# Patient Record
(demographics unavailable — no encounter records)

---

## 2024-10-29 NOTE — ACTIVE PROBLEMS
[FreeTextEntry1] : [facility] due to preconcept- IVF (own egg). Pt had failed IVF transfer in 05/2024. Pt has 2 embryos left that are not pre-genetically tested. Pt had hysteroscopy 10/25/24 and was taking Lupron for endometriosis. Last dose was 10/17/24. Per pt structrually everything is wnl but the biopsy from inflamed tissue will take 1-2 weeks to result. Pt can proceed with embryo transfer 7 weeks after last lupron dose (~12/5/24). Pt had hx of miscarriage in 2023- IVF - 9 weeks with D&C.  Pt had a thrombophilia work up.  Pt was seen by hematology who mandeep labs and referred her to rheumatology.  Positive anticardiolipin/lupus, beta glycoprotein and SCT.  Heme rec. Lovenox at the start of embryo transfer. Pt states during her 2021 pregnancy she used IUI- IOL due to gestational hypertension and gestational diabetes on insulin (150 Units).  Pt had a c/s due to failed induction.  Pt states she was on MAG and discharged on labetalol for 15 days PP.  No BP issues since. Pt had a A1C which was 6.5% as per pt in 2023.  Pt is currently wearing a CGM and reports without mounjaro her fastings are 150, while she was taking mounjaro her fastings were ~120. Pt states she was also dx with PCOS in 2001- on and off metformin. Pt is currently taking metformin 1000 mg BID. Pt was previously taking NPH 70 U HS and stopped after her failed embryo transfer 05/2024 and started mounjaro 7.5mg. Pt lost ~20 lbs and had to stop taking mounjaro for her hysteroscopy 10/4/24. Pt is followed by endo last visit 7/29/24 and has f/u 11/29/24 and would like to discuss insulin again.  Pt is now being followed by a rheumatologist (Dr. Thurman) which she spoke with 10/2 and has f/u 12/1/24. Pt is currently taking Plaquenil 200 mg BID and was told to start prednisone 10mg and lovenox with her embryo transfer. Pt wants to discuss dosage. Pt is followed by an endocrinologist for Hashimoto's disease which was dx in 2007- TFT's in 2/2024.  Pt is currently taking Synthroid 200 mcg. Pt states she is also currently taking Lexapro 10 mg daily for anxiety-prescribed by PCP- Pt has been on this dosage for about 3 years. pt does not see a therapist.  [Hypertension] : no hypertension [Heart Disease] : no heart disease [Autoimmune Disease] : no autoimmune disease [Renal Disease] : no kidney disease, no UTI [Neurologic Disorder] : no neurologic disorder, no epilepsy [Psychiatric Disorders] : no psychiatric disorders [Depression] : no depression, no post partum depression [Hepatic Disorder] : no hepatitis, no liver disease [Thrombophlebitis] : no varicosities, no phlebitis [Trauma] : no trauma/violence [Blood Transfusion (___ Ml)] : no history of blood transfusion

## 2024-10-29 NOTE — SURGICAL HISTORY
[Fibroids] : no fibroids [Abn Paps] : no abnormal pap smears [Breast Disease] : no breast disease [STI's] : no STI's [Infertility] : infertility [Cysts] : no cysts [OC Use] : no OC use [Last Pap: ___] : Last Pap: [unfilled] [Last Mammo: ___] : Last Mammo: none

## 2024-10-29 NOTE — DISCUSSION/SUMMARY
[FreeTextEntry1] : We had the pleasure of seeing your patient for a Maternal-Fetal Medicine preconception consultation. She had prior preconception visits in October 2023 and May 2024. Today, she was counseled regarding the following issues:  Katelyn has antiphospholipid syndrome (APLS) with triple positive laboratory workup, class 3 obesity, type 2 diabetes, Hashimoto's/hypothyroidism, and depression. She plans to undergo another IVF transfer in December 2024. She had an unsuccessful transfer in May 2024. She subsequently lost approx 25 lbs but has gained approx 8 lbs back. Discussed medication management.  Recommend: Low-dose aspirin daily Lovenox 60 mg daily as prophylactic dose based on her weight--understands risk/benefit in terms of bleeding vs. clotting, should begin after IVF transfer Metformin 2000 mg BID Follow-up scheduled with endocrinologist--further optimization of glucose levels is needed Agree with treatment with plaquenyl/HCQ throughout pregnancy Continue levothyroxine 200 mg daily Regarding prednisone dose for IVF treatment, I encouraged Katelyn to work within the standard institutional protocols for dosing. As when last discussed, I agree that 10 mg predisone daily is acceptable but would not go higher than this. Continue lexapro 10 mg daily, mood is stable, has info on [facility] Outpatient services, if needed During pregnancy, serial growth ultrasounds every 4 weeks in third trimester. Weekly fetal testing from 32 weeks gestation until delivery.  Thank you for requesting a consultation on this patient. The total time spent in preparation for this visit, medical history taking, orders, review of records, counseling the patient, and writing this note was 60 minutes.  At the end of our discussion, the patient indicated that her questions were answered and she seemed satisfied with our discussion. Please do not hesitate to contact us with any questions.  Sincerely,   Tigre Vail MD, [facility] Attending Physician, Maternal-Fetal Medicine

## 2024-10-29 NOTE — FAMILY HISTORY
[Reported Family History Of Birth Defects] : no congenital heart defects [Jose-Sachs Carrier] : no Jose-Sachs [Family History] : no mental retardation/autism [Reported Family History Of Genetic Disease] : no history of child defect in child of baby father

## 2024-10-29 NOTE — OB HISTORY
[Definite:  ___ (Date)] : the last menstrual period was [unfilled] [Normal Amount/Duration] : was of a normal amount and duration [Spotting Between  Menses] : no spotting between menses [Regular Cycle Intervals] : periods have been regular [Pregnancy History] : girl [___] : at [unfilled] weeks GA

## 2024-11-26 NOTE — ASSESSMENT
[Levothyroxine] : The patient was instructed to take Levothyroxine on an empty stomach, separate from vitamins, and wait at least 30 minutes before eating [FreeTextEntry1] : h/o Type 2 DM Noted HgbA1c from 8/27/24 is 5.5%, at goal  She has been off Mounjaro since Oct 4 due to pending fertility workup/embryo transfer Discussed parameters for glucose, both fasting 1hr postprandial and 2hr postprandial to follow after transfer Will stop Metformin at this time and start NPH 10U qhs for now in view of anticipated embryo transfer on 12/2/24. Once confirmed pregnant, should f/u [facility] for diabetes control  h/o Hashimoto hypothyroidism Reviewed labs from 8/62724, TSH 0.258, continue levothyroxine 200mcg daily Will repeat labs with reproductive endo  PCOS h/o anovulatory and irregular cycles Seeing reproductive endo, planned for embryo transfer on 12/2/24  Answered all questions today; patient verbalized understanding of the above RTO in 3 months w/ covering endo while I am out on maternity leave

## 2024-11-26 NOTE — HISTORY OF PRESENT ILLNESS
[Continuous Glucose Monitoring] : Continuous Glucose Monitoring: Yes [Dexcom] : Dexcom [FreeTextEntry1] : NAZ JOYNER  is a 35 year old female with past medical history of Hashimoto hypothyroidism, PCOS, h/o gestational DM, h/o gestational HTN, who presents for management of hypothyroidism and PCOS.   She was diagnosed with PCOS years ago.. She is following with reproductive endocrinology, had miscarriage in July 2023; workup noted possible antiphospholipid and is on aspirin and lovenox as well.She has h/o miscarriage in 2023 and 2024. Now she is planning to do embryo transfer next week. She was on Mounjaro previously but stopped in Oct 2024 due to fertility planning. She is working on diet and exercise. She is only on Metformin.    For hypothyroidism, she is currently taking levothyroxine 200mcgdaily.. She also notes h/o FNA of cyst in 2019, pathology reportedly benign.  [FreeTextEntry2] : 85 [FreeTextEntry3] : 10+3 [FreeTextEntry4] : 1+1

## 2024-11-26 NOTE — REASON FOR VISIT
[Home] : at home, [unfilled] , at the time of the visit. [Medical Office: ([facility])___] : at the medical office located in  [Patient] : the patient [Follow - Up] : a follow-up visit

## 2024-11-26 NOTE — PHYSICAL EXAM
[Alert] : alert [No Acute Distress] : no acute distress [Well Developed] : well developed [Normal Sclera/Conjunctiva] : normal sclera/conjunctiva [EOMI] : extra ocular movement intact [No Proptosis] : no proptosis [No Lid Lag] : no lid lag [Normal Hearing] : hearing was normal [No LAD] : no lymphadenopathy [Supple] : the neck was supple [Thyroid Not Enlarged] : the thyroid was not enlarged [No Thyroid Nodules] : no palpable thyroid nodules [No Respiratory Distress] : no respiratory distress [No Accessory Muscle Use] : no accessory muscle use [Normal Rate and Effort] : normal respiratory rate and effort [Clear to Auscultation] : lungs were clear to auscultation bilaterally [Normal S1, S2] : normal S1 and S2 [No Murmurs] : no murmurs [Normal Rate] : heart rate was normal [Regular Rhythm] : with a regular rhythm [No Edema] : no peripheral edema [Normal Bowel Sounds] : normal bowel sounds [Not Tender] : non-tender [Not Distended] : not distended [Soft] : abdomen soft [Normal Supraclavicular Nodes] : no supraclavicular lymphadenopathy [Normal Anterior Cervical Nodes] : no anterior cervical lymphadenopathy [No Stigmata of Cushings Syndrome] : no stigmata of Cushings Syndrome [Normal Gait] : normal gait [No Clubbing, Cyanosis] : no clubbing  or cyanosis of the fingernails [No Involuntary Movements] : no involuntary movements were seen [Acanthosis Nigricans] : acanthosis nigricans present [No Tremors] : no tremors [Oriented x3] : oriented to person, place, and time [Normal Insight/Judgement] : insight and judgment were intact [Foot Ulcers] : no foot ulcers [Acne] : no acne [Hirsutism] : no hirsutism

## 2025-02-10 NOTE — HISTORY OF PRESENT ILLNESS
[FreeTextEntry1] : CPE [de-identified] : Katelyn is here today for CPE.  She has been feeling well overall.  She is planning to start another transfer cycle soon.  We reviewed her most recent labs.  She also has had some neck and lateral hip pain.  Neck pain is worse when she bends her neck all the way up and down.  She also had shooting pain down the back.  She also has some right-sided hip pain.  Worse when she tries to put on socks and shoes.  Radiates down the lateral right leg.  No bowel/bladder incontinence.  No fevers or chills.  No other acute concerns.

## 2025-02-10 NOTE — ASSESSMENT
[FreeTextEntry1] :  CPE CBC,CMP, A1c, lipids, UA EKG is SR  HCM PAP:UTD  1. Hypothyroidism Recent TFTs normal. Will have checked again with Fertility  2. Hx of T2DM Recheck CMP, A1c, urine alb/cr Currently metformin Yearly eye exam  3, Anxiety/depression Continue Lexapro  4. Neck pain/sciatica Likely some underlying muscle spasm Trial of cyclobenzaprine 5mg qhs prn Ortho eval

## 2025-02-10 NOTE — PHYSICAL EXAM
[No Acute Distress] : no acute distress [Well Nourished] : well nourished [Well Developed] : well developed [Well-Appearing] : well-appearing [Normal Sclera/Conjunctiva] : normal sclera/conjunctiva [No Lymphadenopathy] : no lymphadenopathy [Thyroid Normal, No Nodules] : the thyroid was normal and there were no nodules present [No Respiratory Distress] : no respiratory distress  [No Accessory Muscle Use] : no accessory muscle use [Clear to Auscultation] : lungs were clear to auscultation bilaterally [Normal Rate] : normal rate  [Regular Rhythm] : with a regular rhythm [Normal S1, S2] : normal S1 and S2 [No Carotid Bruits] : no carotid bruits [No Abdominal Bruit] : a ~M bruit was not heard ~T in the abdomen [No Edema] : there was no peripheral edema [Normal Appearance] : normal in appearance [No Masses] : no palpable masses [No Axillary Lymphadenopathy] : no axillary lymphadenopathy [Soft] : abdomen soft [Non Tender] : non-tender [Non-distended] : non-distended [Normal Bowel Sounds] : normal bowel sounds [Normal Supraclavicular Nodes] : no supraclavicular lymphadenopathy [Normal Axillary Nodes] : no axillary lymphadenopathy [Normal Posterior Cervical Nodes] : no posterior cervical lymphadenopathy [Normal Anterior Cervical Nodes] : no anterior cervical lymphadenopathy [No CVA Tenderness] : no CVA  tenderness [No Spinal Tenderness] : no spinal tenderness [Normal Gait] : normal gait [Alert and Oriented x3] : oriented to person, place, and time [de-identified] : Positive straight leg test on left.  Negative Enoch.  Full range of motion of neck.  Some paravertebral tenderness.

## 2025-02-10 NOTE — HEALTH RISK ASSESSMENT
[Yes] : Yes [No] : In the past 12 months have you used drugs other than those required for medical reasons? No [Never] : Never [Patient reported PAP Smear was normal] : Patient reported PAP Smear was normal [1] : 2) Feeling down, depressed, or hopeless for several days (1) [PHQ-2 Positive] : PHQ-2 Positive [PHQ-9 Positive] : PHQ-9 Positive [I have developed a follow-up plan documented below in the note.] : I have developed a follow-up plan documented below in the note. [[id number]] : 2

## 2025-02-10 NOTE — REVIEW OF SYSTEMS
[Back Pain] : back pain [Fever] : no fever [Chills] : no chills [Fatigue] : no fatigue [Night Sweats] : no night sweats [Chest Pain] : no chest pain [Palpitations] : no palpitations [Shortness Of Breath] : no shortness of breath [Wheezing] : no wheezing [Cough] : no cough [Abdominal Pain] : no abdominal pain [Nausea] : no nausea [Constipation] : no constipation [Diarrhea] : diarrhea [Vomiting] : no vomiting [Dysuria] : no dysuria [Hematuria] : no hematuria [Joint Pain] : no joint pain [Muscle Pain] : no muscle pain [Headache] : no headache [Dizziness] : no dizziness

## 2025-02-27 NOTE — HISTORY OF PRESENT ILLNESS
[de-identified] : 30-year-old female presenting for evaluation of neck and lower back pain.  The patient reports she has had intermittent pain in these areas, though worse during her recent wellness exam with PCP.  She states that this time she was having significant pain in the posterior cervical paraspinal musculature with significantly limited range of motion of the neck.  She was not having any radiating pain into the upper extremities, no numbness tingling or weakness.  SOPHIE questionnaire is negative. There is no ataxia or other abnormal gait. Patient is not falling more than usual and does not have any decrease in dexterity.  She was also reporting back pain, described as dull and aching in nature.  It would radiate into the right glutes/hip.  No bowel / bladder incontinence. No saddle anesthesia.  She did try some methocarbamol which provided relief in symptoms.  Otherwise has not done any conservative treatment thus far.  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  She is currently undergoing fertility treatment.

## 2025-02-27 NOTE — DISCUSSION/SUMMARY
[Medication Risks Reviewed] : Medication risks reviewed [de-identified] : 35 year old female presents with symptoms suggestive of cervical and lumbar spondylosis, gluteal tendinopathy.  Trochanter bursitis, myositis. 35 minutes was spent reviewing the x-rays as well as discussing with the patient their clinical presentation, diagnosis and providing education. Conservative treatment was discussed with the patient at length. Anticipatory guidance regarding disease process, avoidance of acute exacerbation this was discussed at length and all patients commenting concerns were answered to the patient's satisfaction.  Medical massage was ordered for the patient.  The patient was given home exercises as approved by [employer] and directed toward this particular process. Intermittent use of acetaminophen 500 mg 2 tablets t.i.d. p.r.n. mild to moderate pain, ibuprofen 600 mg t.i.d. p.r.n. severe pain with food or milk if there is no medical contraindication was also discussed.  Methocarbamol/cyclobenzaprine as needed, she can contact us for any refills as needed.  Home exercise including stretching on a daily basis for 20-30 minutes was recommended. Heat, ice, topical were discussed as needed. The patient will followup in 6-8 weeks at which point in time if symptoms continue we will order MRI studies to guide treatment plan including possible injection therapy with pain management versus surgical option. All questions and concerns were addressed with the patient and they are in agreement with this plan.  This note was generated using dragon medical dictation software. A reasonable effort has been made for proofreading its contents, but typos may still remain. If there are any questions or points of clarification needed please notify my office.

## 2025-02-27 NOTE — PHYSICAL EXAM
[de-identified] : Cervical and Lumbar Exam  CONSTITUTIONAL:  Patient is a very pleasant individual who is well-nourished and appears stated age.  PSYCHIATRIC:  Alert and oriented times three and in no apparent distress, and participates with orthopedic evaluation well. HEAD:  Atraumatic and  nonsyndromic in appearance. EENT: No thyromegaly, EOMI. RESPIRATORY:  Respiratory rate is regular, not dyspneic on examination. LYMPHATICS:  There is no cervical or axillary lymphadenopathy. There is no inguinal lymphadenopathy INTEGUMENTARY:  Skin is clean, dry, and intact about the bilateral upper/lower extremities and cervical/lumbar spine.  VASCULAR:   There is brisk capillary refill about the bilateral upper/lower extremities and radial pulses are 2/4.  NEUROLOGIC:  Negative L'hirmitte, negative Spurling's sign. There are no pathologic reflexes. There is no decrease in sensation of the bilateral upper / lower extremities on Wartenberg pinwheel/manual examination.  Deep tendon reflexes are well-maintained at +2/4 of the bilateral upper / lower extremities and are symmetric. MUSCULOSKELETAL:  There is no visible muscular atrophy.  Manual motor strength is well maintained in the bilateral upper and lower extremities.  Cervical and lumbar range of motion is well maintained.  The patient ambulates in a non-myelopathic manner. Normal secondary orthopaedic exam of bilateral shoulders, elbows and hands.  Elbow flexion and extension, wrist extension, finger flexion and abduction are well maintained. Negative tension sign and straight leg raise bilaterally. Quad extension, ankle dorsiflexion, EHL, plantar flexion, and ankle eversion are well preserved. Normal secondary orthopaedic exam of bilateral hips, knees and ankles.  Mild tenderness to palpation along the bilateral cervical paraspinal musculature and lumbar paraspinal musculature.  Mild tenderness palpation in the right greater than left gluteal and greater trochanteric region. [de-identified] : X-ray 2 views of the cervical and lumbar spine obtained and reviewed in the office today demonstrating well-maintained lumbar lordosis and straightening of cervical lordosis.  Pedicles are well-visualized.  There is no evidence of any acute osseous abnormalities.  There is evidence of L5-S1 and C6-C7 spondylosis.

## 2025-03-23 NOTE — PHYSICAL EXAM
[Alert] : alert [Normal Sclera/Conjunctiva] : normal sclera/conjunctiva [Normal Outer Ear/Nose] : the ears and nose were normal in appearance [No Neck Mass] : no neck mass was observed [No Respiratory Distress] : no respiratory distress [Normal PMI] : the apical impulse was normal [Normal Bowel Sounds] : normal bowel sounds [No Stigmata of Cushings Syndrome] : no stigmata of Cushings Syndrome [Normal Gait] : normal gait [Oriented x3] : oriented to person, place, and time

## 2025-03-23 NOTE — HISTORY OF PRESENT ILLNESS
[Home] : at home, [unfilled] , at the time of the visit. [Medical Office: ([facility])___] : at the medical office located in  [Telehealth (audio & video)] : This visit was provided via telehealth using real-time 2-way audio visual technology. [FreeTextEntry1] : NAZ JOYNER is a 36 year old female with past medical history of Hashimoto hypothyroidism, PCOS, h/o gestational DM, h/o gestational HTN, who presents for management of hypothyroidism and PCOS.  She was diagnosed with PCOS years ago.. She is following with reproductive endocrinology, had miscarriage in July 2023; workup noted possible antiphospholipid and is on aspirin and lovenox as well.She has h/o miscarriage in 2023 and 2024. Now she is planning to do embryo transfer next week. She was on Mounjaro previously but stopped in Oct 2024 due to fertility planning. She is working on diet and exercise. She is only on Metformin.  For hypothyroidism, she is currently taking levothyroxine 200mcgdaily.. She also notes h/o FNA of cyst in 2019, pathology reportedly benign.

## 2025-05-30 NOTE — REVIEW OF SYSTEMS
[Seasonal Allergies] : seasonal allergies [Ear Pain] : ear pain [Ear Itch] : ear itch [Hearing Loss] : hearing loss [Ear Noises] : ear noises [Sinus Pain] : sinus pain [Sinus Pressure] : sinus pressure [Discolored Nasal Discharge] : discolored nasal discharge [Swelling Neck] : swelling neck [Swelling Face] : face swelling [Anxiety] : anxiety [Depression] : depression [Negative] : Heme/Lymph [FreeTextEntry1] : Bleeding problems

## 2025-05-30 NOTE — ASSESSMENT
[FreeTextEntry1] : Katelyn Allen presents for evaluation of right muffled hearing and aural fullness wtih sinusitis symtpoms. She has recently been on short course of Augmentin. Otoscopic exam revealed acute otitis media on right. Anterior rhinoscopy revealed thick secretions consistent with sinusitis. Nasopharyngoscopy was performed for unilateral eustachian tube dysfunction adn was normal. Will start longer course of augmentin and topical nasal regimen.  - start augmentin x 10 days. Side effects were discussed and include but are not limited to nausea, vomiting, diarrhea, and skin rash. - start flonase 2 sprays to each nostril BID x 3 weeks. - start nasal saline sprays x 3 weeks. - f/u prn. She will check with her OB before starting any new medications.

## 2025-05-30 NOTE — PHYSICAL EXAM
[Nasal Endoscopy Performed] : nasal endoscopy was performed, see procedure section for findings [Midline] : trachea located in midline position [Normal] : no rashes [Removed] : palatine tonsils previously removed [de-identified] : right TM with opaque effusion, left TM wnl  [de-identified] : thick secretions on right

## 2025-05-30 NOTE — REASON FOR VISIT
[Initial Consultation] : an initial consultation for [FreeTextEntry2] : Recurrent sinus and ear infection

## 2025-05-30 NOTE — HISTORY OF PRESENT ILLNESS
[de-identified] : Katelyn Allen is a 36 year old female with who is currently 11 weeks pregnant with maternal antiphospholipid antibody syndrome on lovenox and baby aspirin, s/p remote tonsillectomy who presents for evaluation of aural fullness on right. She notes that for 6 weeks she started having URI where she proceeded to urgent care and was told she had viral URI. She was given ipratropium at urgent care without relief. She notes that 3 weeks ago she started having right aural fullness after otalgia on right. She notes persistent right aural fullness. She notes improved muffled hearing on right. She denies tinnitus. She denies vertigo. She denies otalgia currently.  She denies otorrhea. She denies recurrent ear infections or fever. She notes facial pressure with discolored rhinorrhea. She notes intermittent nasal congestion and intermittent postnasal drainage. She denies recurrent sinus infection. She denies prior allergy testing. She is not currently using nasal spray. She had tele visit with ENT last week who prescribed 5-day course of Augmentin with improvement of symptoms.

## 2025-06-10 NOTE — LETTER GREETING
[FreeTextEntry1] : The patient is a 56-year-old lady referred by Dr. Hernandez for evaluation of pulmonary nodule\par \par The patient has developed upper respiratory infection and sinus problems that have been persistent since mid December. She has been treated with at least 3 courses of antibiotics and several courses of oral corticosteroids\par She was sent for a CT of the sinuses which demonstrated pan sinusitis as well as air-fluid levels in the maxillary sinuses\par She still is congested . She has pressure in the front of the sinuses. She does not have any fever or purulent sputum or nasal drainage at this time\par She did seem more an ENT physician who sent her for the CAT scan she did not go for any followup\par \par She was sent for a CT of the chest which demonstrated 2 less than 4 mm lesions in the right upper lobe. She also had ground glass infiltrates in the right middle lobe and the lingula that were identified\par \par The patient has hypertension and she takes losartan metoprolol hydrochlorothiazide\par She has a significant family history for coronary artery disease on her father's side, and brother--she is followed by Dr. Artis she does not have any underlying coronary artery disease at this time\par She has GI symptoms of uncertain etiology and she takes omeprazole\par \par She has 2 children both C-sections\par \par The patient is a smoker of 5-7 cigarettes a week and has never really smoked more than this. Her  smokes cigars occasionally. She works as a realtor [Dear  ___] : Dear  [unfilled], [I had the pleasure of seeing your patient today.] : I had the pleasure of seeing your patient today

## 2025-06-10 NOTE — ACTIVE PROBLEMS
[Hypertension] : no hypertension [Heart Disease] : no heart disease [Renal Disease] : no kidney disease, no UTI [Neurologic Disorder] : no neurologic disorder, no epilepsy [Hepatic Disorder] : no hepatitis, no liver disease [Thrombophlebitis] : no varicosities, no phlebitis [Trauma] : no trauma/violence [Blood Transfusion (___ Ml)] : no history of blood transfusion

## 2025-06-10 NOTE — VITALS
[US Date: ___] : ultrasound performed on [unfilled]. [GA= ___ Weeks] : Results were GA of [unfilled] weeks [GA= ___ Days] : and [unfilled] day(s) [BRYNN by US (date): ___] : The calculated BRYNN by US is [unfilled] [By US] : this is the final BRYNN

## 2025-06-11 NOTE — CHIEF COMPLAINT
[G ___] : G [unfilled] [P ___] : P [unfilled] [de-identified] : APLS, IVF, type 2 diabetes, PCOS, Hashimoto's/hypothyroidism, AMA, previous c/s/PEC, anxiety

## 2025-06-11 NOTE — HISTORY OF PRESENT ILLNESS
[FreeTextEntry1] : Katelyn is presenting today for an initial MFM consult in this pregnancy. She had a preconception consultation prior to pregnancy. She has several risk factors complicating her pregnancy.  She had one prior delivery which was a CS for a failed IOL for severe preeclampsia at 36 weeks. That was an IUI pregnancy due to history of PCOS. She then underwent IVF, and had multiple consecutive pregnancy losses including one 9 week miscarriage, 2 blighted ovums, and a chemical pregnancy. She underwent APLS testing for recurrent pregnancy loss, and was found to have positive anticardiolipin, beta-2 glycoprotein, and lupus anticoagulant. She has seen hematology, rheumatology, and MFM previously, and have been managed on baby aspirin and Lovenox 60mg. This was an IVF pregnancy using her own eggs from age 34. She also was on prednisone for IVF which she is tapering (5mg now) and Plaquenil per her rheumatologist due to elevated early Sjogren's marker SP-1 (negative anti-SSA and SSB). She has no history of embolism.  She also has type 2 diabetes diagnosed in 2023 (had GDM on insulin in prior pregnancy). She has a GCM in place. She takes NPH 40-60u nightly, which shse doses based on her night time sugar. She reports fasting blood glucose . Her average glucose on her CGM is 111mg/dl. Last A1c was 5.8% in February. She last saw an ophthalmologist in February. In addition, she has hypothyroid on synthroid 175 mcg (recently decreased from 200 based on thyroid function tests), anxiety on Lexapro 20mg.

## 2025-06-11 NOTE — SURGICAL HISTORY
[Last Pap: ___] : Last Pap: [unfilled] [Fibroids] : no fibroids [Abn Paps] : no abnormal pap smears [Breast Disease] : no breast disease [STI's] : no STI's [Infertility] : no infertility [Cysts] : no cysts [OC Use] : no OC use

## 2025-06-11 NOTE — DISCUSSION/SUMMARY
[FreeTextEntry1] : We had the pleasure of seeing Ms. Allen for a Maternal-Fetal Medicine consultation today. She is a 37yo  at 12 weeks and 2 days of gestation with a history of  anti-phospholipid syndrome, T2DM, hypothyroidism, obesity, IVF pregnancy, anxiety, preeclampsia in prior pregnancy, prior  section, and AMA.  Antiphospholipid Antibody Syndrome: Antiphospholipid Antibody Syndrome requires the presence of at least one clinical and one laboratory criteria.  Clinical criteria include one or more unexplained deaths or a morphologically normal fetus at or beyond the 10th week of gestation, one or more premature births before 34 weeks gestation due to eclampsia/severe pre-eclampsia/or features consistent with placental insufficiency, or three or more unexplained consecutive spontaneous abortions before 10 weeks gestation (which is the criteria she meets most closely, though unknown if genetics were known in the case of all of her losses).  Abnormal laboratory tests must occur on at least 2 occasions > 12 weeks apart and must occur within a 5-year time frame.  Abnormal laboratory tests include lupus anticoagulant presence, elevated anticardiolipin antibody IgG or IgM, or elevated Anti-B2 glycoprotein-I IgG or IgM >99th percentile.  APS is associated with multiple complications including venous thromboembolism, early onset pre-eclampsia, early pregnancy loss, fetal growth restriction, fetal death, and  birth.  Therapy for APS with >3 unexplained consecutive pregnancy losses at < 10 weeks gestation or > 1 fetal loss > 10 weeks includes low dose aspirin and prophylactic heparin (either unfractionated or LMWH).  Maternal complications of APS include venous and arterial thromboembolism (65-70% are venous), pre-eclampsia (18%-48%), autoimmune thrombocytopenia, and rarely catastrophic antiphospholipid syndrome.  Fetal complications of APS include pregnancy loss and fetal death, fetal growth restriction,  birth, and placental abruption.  Fetal surveillance is initiated at 32 weeks gestation. Delivery should be considered at 39 and 0/7 to 39 and 6/7 weeks gestation to control timing of anticoagulation discontinuation. She is on lovenox 60mg now and should consider transition to heparin at 36 weeks. Estrogen containing contraceptives are contraindicated.  Type 2 Diabetes Mellitus: Patient was counseled regarding diet, blood sugar testing, and managing diabetes during pregnancy. Tight glycemic control in pregnancy is necessary to decrease fetal and  risks including macrosomia, shoulder dystocia, medically or obstetrically indicated  delivery,  section, polyhydramnios, and preeclampsia. It was discussed that women who are able to maintain good glycemic control with diet and/or medication generally have favorable obstetric outcomes. She understands that fasting glucose values should be <90 and 1-hour postprandial values should be <140. She has a CGM in place and was counseled on pregnancy goal range of 65-140mg/dL. Based on recent CGM data, we recommend NPH 50uqHS (she was previously dosing between 40-60). She should increase by 2 units if fasting blood glucose is >90 for 2 days, and by 4 units if >100 for 2 days. She should follow with diabetes education. It was discussed that she may require more insulin as gestation progresses. Fetal echocardiogram is recommended (referral provided). Antepartum fetal surveillance should begin at 32 weeks and serial growth ultrasounds should be continued throughout pregnancy. Delivery timing will be better delineated as gestation progresses.   Obesity in Pregnancy: Obesity is associated with an increased risk for gestational diabetes, hypertension in pregnancy, postpartum hemorrhage secondary to uterine atony, venous thromboembolism, and failure of regional anesthesia. In addition, the fetus is at increased risk for congenital anomalies. Malformations may be more difficult to assess by ultrasound evaluation due to the decreased sensitivity in the setting of obesity. The [place] [employer] recommends obese pregnant patients gain approximately 11-20 pounds during gestation.   Advanced Maternal Age (AMA): Women who are of advanced maternal age (typically defined as age 35 at delivery) are at an increased risk for fetal aneuploidy, spontaneous , congenital malformations, diabetes, and hypertensive disorders of pregnancy including preeclampsia,  delivery, stillbirth, and low birth weight neonates. I informed her of the association between advanced maternal age and fetal chromosomal disorders such as Down syndrome and structural birth defects. She was made aware that prenatal diagnosis is available to determine whether the fetus she is carrying has normal or abnormal chromosomes, and normal or abnormal anatomy. I discussed the various screening and diagnostic tests used for prenatal diagnosis. She plans to undergo NIPS for screening. She was offered diagnostic testing with either chorionic villus sampling or genetic amniocentesis, which she declines at this time. I told her that advanced maternal age has been associated with a higher incidence of preeclampsia. I recommended prophylactic aspirin to reduce her risk of developing preeclampsia.   Hypothyroidism in Pregnancy: Hypothyroidism is defined as an elevated TSH with low T 4 or a TSH greater than 10 mIU/L. Overt maternal hypothyroidism has consistently been associated with increased risk of adverse pregnancy complications and detrimental neurocognitive development. These include premature birth, low birth weight, pregnancy loss, placental abruption, and fetal death. Thus, maintaining optimal thyroid function is essential. She is currently taking levothyroxine 175 mcg. In pregnancy, TSH should be tested at least every trimester to confirm that it is within trimester-specific goal ranges: 0.1-2.5 uIU/mL in the first trimester, 0.2-3 uIU/mL in the second trimester and 0.3-3 uIU/mL in the third trimester and every 4-6 weeks if medication dose is adjusted.   Prior Low-Transverse  Section:  Obstetrical history is significant for 1 prior  deliveries. Options of rCS vs TOLAC were reviewed. Having multiple  sections increases the risk of complications, including placenta accreta spectrum disorders, uterine rupture, adhesion formation,  injury to bowel/bladder/adjacent organs and increased blood loss. TOLAC is associated with a small risk of   uterine rupture (approximately 0.5-0.9%).  Uterine rupture can have catastrophic consequences for mother and fetus due to internal hemorrhage. After discussion, she likely will plan for repeat CS.  History of Pre-Eclampsia: The patient is at risk for recurrence of pre-eclampsia in this pregnancy based on her history. She does not have chronic hypertension. The risk of having pre-eclampsia during a second pregnancy after having pre-eclampsia in the first pregnancy is approximately 15%. I recommend low-dose aspirin (which she is already taking) to reduce the risk of pre-eclampsia.   Anxiety: Severe untreated psychiatric conditions increase the risk for problems in pregnancy including nonattendance to prenatal care,  poor appetite/weight gain, insomnia, worsening of mood disorders, suicidal ideation, lack of breastfeeding and mother-infant bonding. Treatment during pregnancy may be necessary for the health of the patient and the baby. She is currently taking Lexapro 20 mg daily which is helping with her anxiety. SSRI medications may result in a slight increased risk of  withdrawal and persistent pulmonary hypertension in the . We recommend continuing her Lexapro during pregnancy to control her anxiety.   Summary of recommendations: -Continue baby aspirin and lovenox 60mg daily (to transition to unfractionated heparin at 36 weeks) -Diabetes educator/nutrition consultation -NPH 50u qHS, to titrate as needed -Early anatomy ultrasound at 16 weeks, comprehensive anatomy ultrasound at 20 weeks, fetal echocardiogram at around 22 weeks -Serial growth ultrasounds in the 3rd trimester - fetal surveillance to begin at 32 weeks -Delivery no later than the 39th week, to be specifically determined as pregnancy progresses  At the end of our discussion, the patient indicated that her questions were answered, and she seemed satisfied with our discussion. Please do not hesitate to contact us with any questions.   Sincerely,  Meghan Overton MD  Fellow, Maternal-Fetal Medicine   Tavo Hill MD   Attending Maternal-Fetal Medicine

## 2025-06-11 NOTE — OB HISTORY
[Definite:  ___ (Date)] : the last menstrual period was [unfilled] [Normal Amount/Duration] : was of a normal amount and duration [Regular Cycle Intervals] : periods have been regular [Pregnancy History] : girl [___] : #5 ([unfilled]): [Reproductive Assisted] : Reproductive Assisted conception [Spotting Between  Menses] : no spotting between menses [de-identified] : IVF- own egg- 5-day frozen transfer

## 2025-06-24 NOTE — HISTORY OF PRESENT ILLNESS
[Continuous Glucose Monitoring] : Continuous Glucose Monitoring: Yes [Dexcom] : Dexcom [FreeTextEntry1] : NAZ JOYNER  is a 36 year old female with past medical history of Hashimoto hypothyroidism, type 2 DM PCOS, h/o gestational DM, h/o gestational HTN, who presents for management of diabetes hypothyroidism and PCOS.   Currently she is 13 weeks pregnant, following with MFM in [place]. She completed progesterone and prednisone. SHe is still on NPH 70U daily and Metformin. She is using DEXCOM CGM She was diagnosed with PCOS years ago.. She is following with reproductive endocrinology, had miscarriage in July 2023; workup noted possible antiphospholipid and is on aspirin and lovenox as well.She has h/o miscarriage in 2023 and 2024. . She was on Mounjaro previously but stopped in Oct 2024 due to fertility planning.   For hypothyroidism, she is currently taking levothyroxine 175mcgdaily.. She also notes h/o FNA of cyst in 2019, pathology reportedly benign.  [Finger Stick] : Finger Stick: No [Hypoglycemia] : Patient is not hypoglycemic. [FreeTextEntry2] : 91 [FreeTextEntry3] : 8+0 [FreeTextEntry4] : 1+1 [de-identified] : 6.3 [FreeTextEntry5] : 127 [FreeTextEntry6] : 25.5

## 2025-06-24 NOTE — HISTORY OF PRESENT ILLNESS
[Continuous Glucose Monitoring] : Continuous Glucose Monitoring: Yes [Dexcom] : Dexcom [FreeTextEntry1] : NAZ JOYNER  is a 36 year old female with past medical history of Hashimoto hypothyroidism, type 2 DM PCOS, h/o gestational DM, h/o gestational HTN, who presents for management of diabetes hypothyroidism and PCOS.   Currently she is 13 weeks pregnant, following with MFM in [place]. She completed progesterone and prednisone. SHe is still on NPH 70U daily and Metformin. She is using DEXCOM CGM She was diagnosed with PCOS years ago.. She is following with reproductive endocrinology, had miscarriage in July 2023; workup noted possible antiphospholipid and is on aspirin and lovenox as well.She has h/o miscarriage in 2023 and 2024. . She was on Mounjaro previously but stopped in Oct 2024 due to fertility planning.   For hypothyroidism, she is currently taking levothyroxine 175mcgdaily.. She also notes h/o FNA of cyst in 2019, pathology reportedly benign.  [Finger Stick] : Finger Stick: No [Hypoglycemia] : Patient is not hypoglycemic. [FreeTextEntry2] : 91 [FreeTextEntry3] : 8+0 [FreeTextEntry4] : 1+1 [de-identified] : 6.3 [FreeTextEntry5] : 127 [FreeTextEntry6] : 25.5

## 2025-06-24 NOTE — ASSESSMENT
[Levothyroxine] : The patient was instructed to take Levothyroxine on an empty stomach, separate from vitamins, and wait at least 30 minutes before eating [FreeTextEntry1] : h/o Type 2 DM, stable and controlled Noted HgbA1c from June 2025 was 5.4% which is at goal Reviewed DEXCOM sensor tracing, noted TIR is 91% 8% high and !% lows. Doing very well She has been off Mounjaro due to current pregnancy, 13 weeks GA Already following with MFM, discussed to switch from NPH to basal and bolus insulin, however she is part of diabetes in pregnancy program with [facility] and will defer to them as they are managing diabetes during her pregnancy Can increase NPH to 80U daily and metformin for now Discussed parameters for glucose, including fasting 1hr postprandial and 2hr postprandial   h/o Hashimoto hypothyroidism As of 5./24/2025 dose change, TSH 0.258, continue levothyroxine 175mcg daily Bloodwork was collected in office today, will f/u results accordingly.   PCOS h/o anovulatory and irregular cycles LMP 3/19/25, 13 weeks pregnant now, BRYNN 12/21/25  Answered all questions today; patient verbalized understanding of the above RTO in 3 months